# Patient Record
Sex: FEMALE | URBAN - METROPOLITAN AREA
[De-identification: names, ages, dates, MRNs, and addresses within clinical notes are randomized per-mention and may not be internally consistent; named-entity substitution may affect disease eponyms.]

---

## 2021-11-24 ENCOUNTER — TELEPHONE (OUTPATIENT)
Dept: NEUROLOGY | Age: 56
End: 2021-11-24

## 2021-11-24 NOTE — TELEPHONE ENCOUNTER
This is not our patient. No office visits notes, not insurance cards in the chart.  Called Tricia and left message think we have the wrong patient, asked she call back and confirm the correct patient with  and address/ phone number so we can do PA

## 2021-11-24 NOTE — TELEPHONE ENCOUNTER
----- Message from Alisha Nava sent at 11/24/2021  1:52 PM EST -----  Regarding: DELLA Lorenzo/Telephone  General Message/Vendor Calls    Caller's first and last name: Roderick Bermudez)      Reason for call: PA needed for [facility] required yes/no and why: Yes       Best contact number(s):[phone]      Details to clarify the request: PA needed      Alisha Nava